# Patient Record
Sex: FEMALE | Race: WHITE
[De-identification: names, ages, dates, MRNs, and addresses within clinical notes are randomized per-mention and may not be internally consistent; named-entity substitution may affect disease eponyms.]

---

## 2020-03-25 ENCOUNTER — HOSPITAL ENCOUNTER (INPATIENT)
Dept: HOSPITAL 12 - ER | Age: 65
LOS: 2 days | Discharge: HOME | DRG: 511 | End: 2020-03-27
Payer: COMMERCIAL

## 2020-03-25 VITALS — SYSTOLIC BLOOD PRESSURE: 95 MMHG | DIASTOLIC BLOOD PRESSURE: 49 MMHG

## 2020-03-25 VITALS — HEIGHT: 64 IN | BODY MASS INDEX: 21.34 KG/M2 | WEIGHT: 125 LBS

## 2020-03-25 VITALS — SYSTOLIC BLOOD PRESSURE: 101 MMHG | DIASTOLIC BLOOD PRESSURE: 45 MMHG

## 2020-03-25 DIAGNOSIS — Z85.3: ICD-10-CM
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
## 2020-03-25 DIAGNOSIS — E78.5: ICD-10-CM

## 2020-03-25 DIAGNOSIS — Z90.13: ICD-10-CM

## 2020-03-25 DIAGNOSIS — S42.201A: ICD-10-CM

## 2020-03-25 DIAGNOSIS — E10.65: ICD-10-CM

## 2020-03-25 DIAGNOSIS — S52.601A: ICD-10-CM

## 2020-03-25 DIAGNOSIS — E03.9: ICD-10-CM
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
## 2020-03-25 DIAGNOSIS — Z79.4: ICD-10-CM

## 2020-03-25 DIAGNOSIS — S52.501A: Primary | ICD-10-CM

## 2020-03-25 DIAGNOSIS — Z79.890: ICD-10-CM

## 2020-03-25 LAB
BASOPHILS # BLD AUTO: 0 K/UL (ref 0–8)
BASOPHILS NFR BLD AUTO: 0.3 % (ref 0–2)
BUN SERPL-MCNC: 17 MG/DL (ref 7–18)
CHLORIDE SERPL-SCNC: 101 MMOL/L (ref 98–107)
CO2 SERPL-SCNC: 29 MMOL/L (ref 21–32)
CREAT SERPL-MCNC: 0.9 MG/DL (ref 0.6–1.3)
EOSINOPHIL # BLD AUTO: 0 K/UL (ref 0–0.7)
EOSINOPHIL NFR BLD AUTO: 0.4 % (ref 0–7)
GLUCOSE SERPL-MCNC: 199 MG/DL (ref 74–106)
HCT VFR BLD AUTO: 42.8 % (ref 31.2–41.9)
HGB BLD-MCNC: 14.6 G/DL (ref 10.9–14.3)
LYMPHOCYTES # BLD AUTO: 1.5 K/UL (ref 20–40)
LYMPHOCYTES NFR BLD AUTO: 14.2 % (ref 20.5–51.5)
MCH RBC QN AUTO: 29 UUG (ref 24.7–32.8)
MCHC RBC AUTO-ENTMCNC: 34 G/DL (ref 32.3–35.6)
MCV RBC AUTO: 85.2 FL (ref 75.5–95.3)
MONOCYTES # BLD AUTO: 0.4 K/UL (ref 2–10)
MONOCYTES NFR BLD AUTO: 3.9 % (ref 0–11)
NEUTROPHILS # BLD AUTO: 8.6 K/UL (ref 1.8–8.9)
NEUTROPHILS NFR BLD AUTO: 81.2 % (ref 38.5–71.5)
PLATELET # BLD AUTO: 190 K/UL (ref 179–408)
POTASSIUM SERPL-SCNC: 4 MMOL/L (ref 3.5–5.1)
RBC # BLD AUTO: 5.02 MIL/UL (ref 3.63–4.92)
WBC # BLD AUTO: 10.5 K/UL (ref 3.8–11.8)

## 2020-03-25 PROCEDURE — G0378 HOSPITAL OBSERVATION PER HR: HCPCS

## 2020-03-25 PROCEDURE — A4663 DIALYSIS BLOOD PRESSURE CUFF: HCPCS

## 2020-03-25 PROCEDURE — C1713 ANCHOR/SCREW BN/BN,TIS/BN: HCPCS

## 2020-03-25 PROCEDURE — A4649 SURGICAL SUPPLIES: HCPCS

## 2020-03-25 PROCEDURE — A9150 MISC/EXPER NON-PRESCRIPT DRU: HCPCS

## 2020-03-25 PROCEDURE — C1776 JOINT DEVICE (IMPLANTABLE): HCPCS

## 2020-03-25 RX ADMIN — INSULIN GLARGINE SCH UNITS: 100 INJECTION, SOLUTION SUBCUTANEOUS at 21:00

## 2020-03-25 RX ADMIN — ATORVASTATIN CALCIUM SCH MG: 20 TABLET, FILM COATED ORAL at 21:00

## 2020-03-25 RX ADMIN — Medication SCH EACH: at 17:12

## 2020-03-25 RX ADMIN — Medication PRN MG: at 22:00

## 2020-03-25 RX ADMIN — Medication PRN MG: at 15:51

## 2020-03-25 RX ADMIN — SODIUM CHLORIDE PRN UNIT: 9 INJECTION, SOLUTION INTRAVENOUS at 23:03

## 2020-03-25 RX ADMIN — Medication SCH EACH: at 23:05

## 2020-03-25 RX ADMIN — SODIUM CHLORIDE PRN MG: 9 INJECTION, SOLUTION INTRAVENOUS at 15:50

## 2020-03-25 RX ADMIN — DEXTROSE AND SODIUM CHLORIDE PRN MLS/HR: 5; .45 INJECTION, SOLUTION INTRAVENOUS at 13:34

## 2020-03-25 RX ADMIN — SODIUM CHLORIDE PRN MG: 9 INJECTION, SOLUTION INTRAVENOUS at 22:00

## 2020-03-25 NOTE — NUR
Received patient awake and alert. Patient shows no signs or symptoms of distress at this 
time. Complains 6/10 right arm pain. Due for morphine along with zofran at 2200. Call light 
within reach. Bed set to lowest position. Will continue to monitor patient

## 2020-03-25 NOTE — NUR
EL FRANCO TALKED TO DR. COLLADO FOR ORTHO CONSULT. PT WILL BE ADMITTED FOR SURGERY 
OT HE RIGHT WRIST.

## 2020-03-25 NOTE — NUR
64 year old female received from er via wheel chair for right shoulder nd r wrist fx ,pt is 
axox4 call light with in reach.md called for admission orders,received and notified

## 2020-03-25 NOTE — NUR
Patient seen by Dr. Campbell. Patient is medically cleared for surgery tomorrow with Dr. Wynn. Will continue to monitor patient.

## 2020-03-26 VITALS — DIASTOLIC BLOOD PRESSURE: 59 MMHG | SYSTOLIC BLOOD PRESSURE: 116 MMHG

## 2020-03-26 VITALS — DIASTOLIC BLOOD PRESSURE: 50 MMHG | SYSTOLIC BLOOD PRESSURE: 122 MMHG

## 2020-03-26 VITALS — DIASTOLIC BLOOD PRESSURE: 64 MMHG | SYSTOLIC BLOOD PRESSURE: 138 MMHG

## 2020-03-26 VITALS — SYSTOLIC BLOOD PRESSURE: 111 MMHG | DIASTOLIC BLOOD PRESSURE: 53 MMHG

## 2020-03-26 LAB
ALP SERPL-CCNC: 67 U/L (ref 50–136)
ALT SERPL W/O P-5'-P-CCNC: 22 U/L (ref 14–59)
AST SERPL-CCNC: 13 U/L (ref 15–37)
BASOPHILS # BLD AUTO: 0 K/UL (ref 0–8)
BASOPHILS NFR BLD AUTO: 0.1 % (ref 0–2)
BILIRUB DIRECT SERPL-MCNC: 0.1 MG/DL (ref 0–0.2)
BILIRUB SERPL-MCNC: 0.6 MG/DL (ref 0.2–1)
BUN SERPL-MCNC: 16 MG/DL (ref 7–18)
CHLORIDE SERPL-SCNC: 101 MMOL/L (ref 98–107)
CHOLEST SERPL-MCNC: 165 MG/DL (ref ?–200)
CO2 SERPL-SCNC: 28 MMOL/L (ref 21–32)
CREAT SERPL-MCNC: 0.9 MG/DL (ref 0.6–1.3)
EOSINOPHIL # BLD AUTO: 0 K/UL (ref 0–0.7)
EOSINOPHIL NFR BLD AUTO: 0 % (ref 0–7)
GLUCOSE SERPL-MCNC: 299 MG/DL (ref 74–106)
HCT VFR BLD AUTO: 38.4 % (ref 31.2–41.9)
HDLC SERPL-MCNC: 85 MG/DL (ref 40–60)
HGB BLD-MCNC: 12.8 G/DL (ref 10.9–14.3)
LYMPHOCYTES # BLD AUTO: 0.8 K/UL (ref 20–40)
LYMPHOCYTES NFR BLD AUTO: 7.3 % (ref 20.5–51.5)
MAGNESIUM SERPL-MCNC: 1.8 MG/DL (ref 1.8–2.4)
MCH RBC QN AUTO: 28.5 UUG (ref 24.7–32.8)
MCHC RBC AUTO-ENTMCNC: 33 G/DL (ref 32.3–35.6)
MCV RBC AUTO: 85.6 FL (ref 75.5–95.3)
MONOCYTES # BLD AUTO: 0.9 K/UL (ref 2–10)
MONOCYTES NFR BLD AUTO: 8.1 % (ref 0–11)
NEUTROPHILS # BLD AUTO: 9.3 K/UL (ref 1.8–8.9)
NEUTROPHILS NFR BLD AUTO: 84.5 % (ref 38.5–71.5)
PHOSPHATE SERPL-MCNC: 3.2 MG/DL (ref 2.5–4.9)
PLATELET # BLD AUTO: 158 K/UL (ref 179–408)
POTASSIUM SERPL-SCNC: 4.4 MMOL/L (ref 3.5–5.1)
RBC # BLD AUTO: 4.49 MIL/UL (ref 3.63–4.92)
TRIGL SERPL-MCNC: 54 MG/DL (ref 30–150)
TSH SERPL DL<=0.005 MIU/L-ACNC: 0.09 MIU/ML (ref 0.36–3.74)
WBC # BLD AUTO: 11 K/UL (ref 3.8–11.8)
WS STN SPEC: 6.9 G/DL (ref 6.4–8.2)

## 2020-03-26 PROCEDURE — 0PSH04Z REPOSITION RIGHT RADIUS WITH INTERNAL FIXATION DEVICE, OPEN APPROACH: ICD-10-PCS | Performed by: ORTHOPAEDIC SURGERY

## 2020-03-26 RX ADMIN — ATORVASTATIN CALCIUM SCH MG: 20 TABLET, FILM COATED ORAL at 20:47

## 2020-03-26 RX ADMIN — DEXTROSE PRN MLS/HR: 50 INJECTION, SOLUTION INTRAVENOUS at 17:13

## 2020-03-26 RX ADMIN — INSULIN GLARGINE SCH UNITS: 100 INJECTION, SOLUTION SUBCUTANEOUS at 20:46

## 2020-03-26 RX ADMIN — Medication SCH EACH: at 06:20

## 2020-03-26 RX ADMIN — Medication PRN MG: at 04:23

## 2020-03-26 RX ADMIN — SODIUM CHLORIDE PRN UNIT: 9 INJECTION, SOLUTION INTRAVENOUS at 17:18

## 2020-03-26 RX ADMIN — Medication SCH MG: at 23:58

## 2020-03-26 RX ADMIN — SODIUM CHLORIDE PRN UNIT: 9 INJECTION, SOLUTION INTRAVENOUS at 06:23

## 2020-03-26 RX ADMIN — Medication SCH EACH: at 12:17

## 2020-03-26 RX ADMIN — LEVOTHYROXINE SODIUM SCH MCG: 100 TABLET ORAL at 06:15

## 2020-03-26 RX ADMIN — Medication SCH MG: at 18:34

## 2020-03-26 RX ADMIN — Medication SCH EACH: at 17:14

## 2020-03-26 RX ADMIN — SODIUM CHLORIDE PRN UNIT: 9 INJECTION, SOLUTION INTRAVENOUS at 12:18

## 2020-03-26 RX ADMIN — KETOROLAC TROMETHAMINE SCH MG: 15 INJECTION, SOLUTION INTRAMUSCULAR; INTRAVENOUS at 23:58

## 2020-03-26 RX ADMIN — DEXTROSE AND SODIUM CHLORIDE PRN MLS/HR: 5; .45 INJECTION, SOLUTION INTRAVENOUS at 03:00

## 2020-03-26 RX ADMIN — SODIUM CHLORIDE PRN MG: 9 INJECTION, SOLUTION INTRAVENOUS at 04:02

## 2020-03-26 RX ADMIN — ATORVASTATIN CALCIUM SCH MG: 20 TABLET, FILM COATED ORAL at 20:27

## 2020-03-26 NOTE — NUR
Patient shows no signs or symptoms of distress at this time. Patient endorsed to AM nurse in 
stable condition.

## 2020-03-27 VITALS — DIASTOLIC BLOOD PRESSURE: 76 MMHG | SYSTOLIC BLOOD PRESSURE: 126 MMHG

## 2020-03-27 VITALS — DIASTOLIC BLOOD PRESSURE: 72 MMHG | SYSTOLIC BLOOD PRESSURE: 130 MMHG

## 2020-03-27 LAB
BASOPHILS # BLD AUTO: 0 K/UL (ref 0–8)
BASOPHILS NFR BLD AUTO: 0.1 % (ref 0–2)
BUN SERPL-MCNC: 13 MG/DL (ref 7–18)
CHLORIDE SERPL-SCNC: 101 MMOL/L (ref 98–107)
CO2 SERPL-SCNC: 27 MMOL/L (ref 21–32)
CREAT SERPL-MCNC: 0.7 MG/DL (ref 0.6–1.3)
EOSINOPHIL # BLD AUTO: 0 K/UL (ref 0–0.7)
EOSINOPHIL NFR BLD AUTO: 0.1 % (ref 0–7)
GLUCOSE SERPL-MCNC: 174 MG/DL (ref 74–106)
HCT VFR BLD AUTO: 31.8 % (ref 31.2–41.9)
HGB BLD-MCNC: 10.8 G/DL (ref 10.9–14.3)
LYMPHOCYTES # BLD AUTO: 1.5 K/UL (ref 20–40)
LYMPHOCYTES NFR BLD AUTO: 12.4 % (ref 20.5–51.5)
MCH RBC QN AUTO: 29 UUG (ref 24.7–32.8)
MCHC RBC AUTO-ENTMCNC: 34 G/DL (ref 32.3–35.6)
MCV RBC AUTO: 85.3 FL (ref 75.5–95.3)
MONOCYTES # BLD AUTO: 1 K/UL (ref 2–10)
MONOCYTES NFR BLD AUTO: 8.4 % (ref 0–11)
NEUTROPHILS # BLD AUTO: 9.3 K/UL (ref 1.8–8.9)
NEUTROPHILS NFR BLD AUTO: 79 % (ref 38.5–71.5)
PLATELET # BLD AUTO: 141 K/UL (ref 179–408)
POTASSIUM SERPL-SCNC: 4.3 MMOL/L (ref 3.5–5.1)
RBC # BLD AUTO: 3.73 MIL/UL (ref 3.63–4.92)
WBC # BLD AUTO: 11.7 K/UL (ref 3.8–11.8)

## 2020-03-27 RX ADMIN — Medication SCH EACH: at 00:01

## 2020-03-27 RX ADMIN — ACETAMINOPHEN PRN MG: 325 TABLET ORAL at 02:08

## 2020-03-27 RX ADMIN — KETOROLAC TROMETHAMINE SCH MG: 15 INJECTION, SOLUTION INTRAMUSCULAR; INTRAVENOUS at 12:00

## 2020-03-27 RX ADMIN — SODIUM CHLORIDE PRN UNIT: 9 INJECTION, SOLUTION INTRAVENOUS at 11:21

## 2020-03-27 RX ADMIN — DEXTROSE PRN MLS/HR: 50 INJECTION, SOLUTION INTRAVENOUS at 05:44

## 2020-03-27 RX ADMIN — Medication SCH MG: at 05:44

## 2020-03-27 RX ADMIN — SODIUM CHLORIDE PRN UNIT: 9 INJECTION, SOLUTION INTRAVENOUS at 00:06

## 2020-03-27 RX ADMIN — KETOROLAC TROMETHAMINE SCH MG: 15 INJECTION, SOLUTION INTRAMUSCULAR; INTRAVENOUS at 05:44

## 2020-03-27 RX ADMIN — Medication SCH EACH: at 06:39

## 2020-03-27 RX ADMIN — ACETAMINOPHEN PRN MG: 325 TABLET ORAL at 08:38

## 2020-03-27 RX ADMIN — Medication SCH EACH: at 11:14

## 2020-03-27 RX ADMIN — Medication SCH MG: at 11:13

## 2020-03-27 RX ADMIN — SODIUM CHLORIDE PRN UNIT: 9 INJECTION, SOLUTION INTRAVENOUS at 07:57

## 2020-03-27 RX ADMIN — LEVOTHYROXINE SODIUM SCH MCG: 100 TABLET ORAL at 06:22

## 2020-03-27 NOTE — NUR
Received patient in bed alert and awake. On room air with no SOB. Right arm in sling and 
covered with ace wrap. IV site on left arm in place infusing D5 1/2 NS with 20 mEq K @ 75, 
tolerating well. Complained of 4/10 pain but did not want any medications. Assisted to 
bathroom to urinate, able to walk. Bed locked in lowest position with 2x siderails up. Will 
continue to monitor.

## 2020-03-27 NOTE — NUR
Patient discharged alert and oriented on wheelchair accompanied by RN. No shortness of 
breath. Right arm wrapped with ace wrap in a sling with complaints of pain upon movement but 
pain slightly relieved by Tramadol. IV on left AC and ID bands removed. DC instructions 
given and signed by patient, verbalized understanding. Patient's belonging list signed and 
all are accounted for. Assisted patient in changing to her clothes. No distress noted. 
Patient picked up by friend Yuliana.

## 2020-03-27 NOTE — NUR
Patient slept well. On routine pain management but patient refusing her Toradol IV order and 
only wants Tramadol and Tylenol. Sling on R arm kept in place. IV on LAC 20g intact and 
patent w/ IVF infusing. Patient educated w/ the use of incentive spirometer. All needs 
attended. Will endorse accordingly

## 2020-03-27 NOTE — NUR
Patient complained of right arm 8/10 pain s/p right wrist surgery. Requested for Tylenol PRN 
as ordered, med given. Patient made comfortable but only ate a little of breakfast.